# Patient Record
Sex: FEMALE | Race: WHITE | NOT HISPANIC OR LATINO | Employment: FULL TIME | URBAN - NONMETROPOLITAN AREA
[De-identification: names, ages, dates, MRNs, and addresses within clinical notes are randomized per-mention and may not be internally consistent; named-entity substitution may affect disease eponyms.]

---

## 2023-03-29 ENCOUNTER — OFFICE VISIT (OUTPATIENT)
Dept: FAMILY MEDICINE CLINIC | Facility: CLINIC | Age: 33
End: 2023-03-29
Payer: COMMERCIAL

## 2023-03-29 VITALS
TEMPERATURE: 98 F | RESPIRATION RATE: 15 BRPM | SYSTOLIC BLOOD PRESSURE: 118 MMHG | HEART RATE: 81 BPM | OXYGEN SATURATION: 99 % | BODY MASS INDEX: 24.11 KG/M2 | WEIGHT: 131 LBS | HEIGHT: 62 IN | DIASTOLIC BLOOD PRESSURE: 72 MMHG

## 2023-03-29 DIAGNOSIS — J10.1 INFLUENZA A: Primary | ICD-10-CM

## 2023-03-29 DIAGNOSIS — K21.9 GASTROESOPHAGEAL REFLUX DISEASE WITHOUT ESOPHAGITIS: ICD-10-CM

## 2023-03-29 LAB
EXPIRATION DATE: ABNORMAL
EXPIRATION DATE: NORMAL
FLUAV AG UPPER RESP QL IA.RAPID: DETECTED
FLUBV AG UPPER RESP QL IA.RAPID: NOT DETECTED
INTERNAL CONTROL: ABNORMAL
INTERNAL CONTROL: NORMAL
Lab: ABNORMAL
Lab: NORMAL
S PYO AG THROAT QL: NEGATIVE
SARS-COV-2 AG UPPER RESP QL IA.RAPID: NOT DETECTED

## 2023-03-29 RX ORDER — ALBUTEROL SULFATE 90 UG/1
2 AEROSOL, METERED RESPIRATORY (INHALATION) EVERY 4 HOURS PRN
Qty: 6.7 G | Refills: 0 | Status: SHIPPED | OUTPATIENT
Start: 2023-03-29

## 2023-03-29 RX ORDER — OMEPRAZOLE 20 MG/1
20 CAPSULE, DELAYED RELEASE ORAL DAILY
Qty: 30 CAPSULE | Refills: 3 | Status: SHIPPED | OUTPATIENT
Start: 2023-03-29

## 2023-03-29 RX ORDER — LORATADINE 10 MG/1
10 TABLET ORAL DAILY
Qty: 30 TABLET | Refills: 2 | Status: SHIPPED | OUTPATIENT
Start: 2023-03-29

## 2023-03-29 RX ORDER — FLUTICASONE PROPIONATE 50 MCG
2 SPRAY, SUSPENSION (ML) NASAL DAILY
Qty: 9.9 ML | Refills: 2 | Status: SHIPPED | OUTPATIENT
Start: 2023-03-29

## 2023-03-29 NOTE — PROGRESS NOTES
Office Note     Name: Gina Ferreira    : 1990     MRN: [id number]     Chief Complaint  Cough, congestion, body aches, headache, sore throat    History of Present Illness:  Gina Ferreira is a 32 y.o. female who presents today for symptoms of cough, congestion, body aches, headache, and sore throat.  She reports that the symptoms originally began 3 weeks ago.  But she reports that the symptoms largely resolved before returning this weekend.  The symptoms have been present for about 4 to 5 days this episode.  She does report having chills but no measured fever.  She reports the cough is dry in nature, no sputum production.  Denies hemoptysis.  She denies shortness of air or wheezing. She does report occasionally experiencing chest tightness with deep breathing.  She reports rhinorrhea, postnasal drainage.  She also reports mild headache during this time.  She also reports an episode of diarrhea this morning.  No blood in the diarrhea.  She reports being very tired and that her appetite is down.  She does report she is continuing to drink well.  At home she has taken Claritin.  She denies any significant past medical history and is not on any daily medicines at present time.  She did not get her flu shot this year.  She denies any chance that she could be pregnant, she has had a tubal ligation.    She does also reports she has been taking omeprazole over-the-counter for GERD.  She reports that this is controlling her symptoms well.  She denies dysphagia.  She does request that a prescription be sent to the pharmacy for this medication.    Subjective     Review of Systems:   Review of Systems   Constitutional: Positive for appetite change, fatigue and fever.   HENT: Positive for congestion, postnasal drip, rhinorrhea and sore throat. Negative for ear pain and trouble swallowing.    Respiratory: Positive for cough and chest tightness. Negative for shortness of breath and wheezing.    Cardiovascular:  Negative for chest pain.   Gastrointestinal: Positive for diarrhea. Negative for abdominal pain, blood in stool, constipation, nausea and vomiting.   Musculoskeletal: Positive for arthralgias and myalgias.   Neurological: Positive for headache. Negative for dizziness, weakness and light-headedness.       I have reviewed the patients family history, social history, past medical history, past surgical history and have updated it as appropriate.     Past Medical History:   Past Medical History:   Diagnosis Date   • Abdominal discomfort in right upper quadrant    • Allergic rhinitis, seasonal    • Anxiety    • Anxiety diarrhea    • Anxiety disorder, unspecified    • Bronchitis, acute    • Candida vaginitis    • Carpal tunnel syndrome, bilateral    • Constipation    • Contact dermatitis and eczema due to detergents    • Cough    • COVID    • Diarrhea    • Dysuria    • Gaseous abdominal distention    • Gastritis    • Gastroenteritis, acute    • GERD (gastroesophageal reflux disease)    • Hyperglycemia    • Hypertension, benign    • Irritable bowel syndrome with constipation    • Lack of immunity to hepatitis B virus demonstrated by serologic test    • Low back pain    • Lymphadenopathy    • Muscle contraction headache    • Nausea    • Rheumatic nodule (HCC)    • Tinea sussy    • Tobacco use disorder    • Tooth abscess    • Uncomplicated herpes simplex    • URI (upper respiratory infection)    • UTI (urinary tract infection)        Past Surgical History:   Past Surgical History:   Procedure Laterality Date   • TUBAL ABDOMINAL LIGATION         Family History:   Family History   Problem Relation Age of Onset   • Stroke Other    • Diabetes Other    • Hypertension Other        Social History:   Social History     Socioeconomic History   • Marital status:    Tobacco Use   • Smoking status: Former     Types: Cigarettes     Passive exposure: Past   • Smokeless tobacco: Current   Substance and Sexual Activity   • Alcohol  "use: Not Currently   • Drug use: Not Currently   • Sexual activity: Defer     Partners: Male       Immunizations:   There is no immunization history on file for this patient.     Medications:     Current Outpatient Medications:   •  albuterol sulfate  (90 Base) MCG/ACT inhaler, Inhale 2 puffs Every 4 (Four) Hours As Needed for Wheezing., Disp: 6.7 g, Rfl: 0  •  fluticasone (FLONASE) 50 MCG/ACT nasal spray, 2 sprays into the nostril(s) as directed by provider Daily., Disp: 9.9 mL, Rfl: 2  •  loratadine (Claritin) 10 MG tablet, Take 1 tablet by mouth Daily., Disp: 30 tablet, Rfl: 2  •  omeprazole (priLOSEC) 20 MG capsule, Take 1 capsule by mouth Daily., Disp: 30 capsule, Rfl: 3    Allergies:   No Known Allergies    Objective     Vital Signs  Vitals:    03/29/23 1104   BP: 118/72   BP Location: Left arm   Patient Position: Sitting   Cuff Size: Adult   Pulse: 81   Resp: 15   Temp: 98 °F (36.7 °C)   SpO2: 99%   Weight: 59.4 kg (131 lb)   Height: 157.5 cm (62\")     Estimated body mass index is 23.96 kg/m² as calculated from the following:    Height as of this encounter: 157.5 cm (62\").    Weight as of this encounter: 59.4 kg (131 lb).          Physical Exam  Vitals and nursing note reviewed.   Constitutional:       General: She is not in acute distress.     Appearance: Normal appearance. She is not ill-appearing, toxic-appearing or diaphoretic.   HENT:      Head: Normocephalic and atraumatic.      Right Ear: Ear canal and external ear normal. Tympanic membrane is not perforated, erythematous, retracted or bulging.      Left Ear: Ear canal and external ear normal. Tympanic membrane is not perforated, erythematous, retracted or bulging.      Nose: Congestion present. No rhinorrhea.      Mouth/Throat:      Mouth: Mucous membranes are moist.      Pharynx: Uvula midline. No pharyngeal swelling, oropharyngeal exudate or posterior oropharyngeal erythema.      Tonsils: No tonsillar exudate. 1+ on the right. 1+ on the left. "      Comments: Postnasal drainage visible in pharynx  Eyes:      General:         Right eye: No discharge.         Left eye: No discharge.      Extraocular Movements: Extraocular movements intact.   Cardiovascular:      Rate and Rhythm: Normal rate and regular rhythm.      Heart sounds: No murmur heard.    No friction rub. No gallop.   Pulmonary:      Effort: No respiratory distress.      Breath sounds: No wheezing, rhonchi or rales.   Abdominal:      General: Bowel sounds are normal.      Tenderness: There is no abdominal tenderness. There is no guarding or rebound.   Musculoskeletal:      Cervical back: Normal range of motion. No rigidity.   Lymphadenopathy:      Cervical: Cervical adenopathy (Anterior) present.   Skin:     General: Skin is warm.   Neurological:      Mental Status: She is alert.      Gait: Gait normal.   Psychiatric:         Mood and Affect: Mood normal.         Thought Content: Thought content normal.          Assessment and Plan     1. Influenza A  -The patient is positive for influenza A.  -We discussed the treatment is supportive, increasing hydration, Tylenol or Motrin for fever and body aches, heating pads for body aches, Flonase for congestion/rhinorrhea, Vicks VapoRub on the chest.  -We discussed the pros and cons of treating with Tamiflu. At this time, we will not use Tamiflu for treatment.  She has been symptomatic for greater than 48 hours.  -We discussed the possibility of post-viral bacterial infections, such as sinusitis and pneumonia. If symptoms worsen, fail to improve, or new symptoms develop, they should return to care  -Have ordered chest x-ray to rule out possible pneumonia, as she has been having symptoms for 3 weeks time.  -Also sent in a prescription of Claritin to help with nasal congestion, cough, etc.  -Prescription for albuterol inhaler has also been sent due to her symptoms of chest tightness.   -She verbalizes understanding and has no further questions.  - POCT  SARS-CoV-2 Antigen JANES + Flu  - POCT rapid strep A  - fluticasone (FLONASE) 50 MCG/ACT nasal spray; 2 sprays into the nostril(s) as directed by provider Daily.  Dispense: 9.9 mL; Refill: 2  - loratadine (Claritin) 10 MG tablet; Take 1 tablet by mouth Daily.  Dispense: 30 tablet; Refill: 2  - XR Chest PA & Lateral; Future  - albuterol sulfate  (90 Base) MCG/ACT inhaler; Inhale 2 puffs Every 4 (Four) Hours As Needed for Wheezing.  Dispense: 6.7 g; Refill: 0    2. Gastroesophageal reflux disease without esophagitis  -Way to control GERD: Lose weight, Raise the head of your bed, avoidance of foods that make symptoms worse, reduce or stop alcohol/ coffee/ soda/ tea/ juice intake and stopping smoking. Recommend Eating small frequent meals and avoidance of laying down after meals.  -Prescription for omeprazole has been sent to the pharmacy.  -Ideally, we would not need use of long-term PPI therapy, as there are risks/consequences of being on PPIs long-term.  -We discussed red flags of GERD.  If she has any of the symptoms, she needs to return to care.  -Verbalizes understanding and has no further questions.  - omeprazole (priLOSEC) 20 MG capsule; Take 1 capsule by mouth Daily.  Dispense: 30 capsule; Refill: 3       Follow Up  Return in about 1 month (around 4/29/2023) for Annual physical.    Rosa Gannon PA-C  [facility] [place] [facility] [place]

## 2023-08-09 ENCOUNTER — OFFICE VISIT (OUTPATIENT)
Dept: FAMILY MEDICINE CLINIC | Facility: CLINIC | Age: 33
End: 2023-08-09
Payer: COMMERCIAL

## 2023-08-09 VITALS
WEIGHT: 133 LBS | SYSTOLIC BLOOD PRESSURE: 110 MMHG | TEMPERATURE: 99.1 F | HEART RATE: 78 BPM | RESPIRATION RATE: 16 BRPM | OXYGEN SATURATION: 99 % | DIASTOLIC BLOOD PRESSURE: 80 MMHG | BODY MASS INDEX: 24.48 KG/M2 | HEIGHT: 62 IN

## 2023-08-09 DIAGNOSIS — U07.1 COVID-19: Primary | ICD-10-CM

## 2023-08-09 LAB
EXPIRATION DATE: ABNORMAL
EXPIRATION DATE: NORMAL
FLUAV AG UPPER RESP QL IA.RAPID: NOT DETECTED
FLUBV AG UPPER RESP QL IA.RAPID: NOT DETECTED
INTERNAL CONTROL: ABNORMAL
INTERNAL CONTROL: NORMAL
Lab: ABNORMAL
Lab: NORMAL
S PYO AG THROAT QL: NEGATIVE
SARS-COV-2 AG UPPER RESP QL IA.RAPID: DETECTED

## 2023-08-09 NOTE — PROGRESS NOTES
Office Note     Name: Gina Ferreira    : 1990     MRN: [id number]     Chief Complaint  Sore Throat, Cough, and Nasal Congestion    History of Present Illness:  Gina Ferreira is a 32 y.o. female who presents today for symptoms of sore throat, cough, nasal congestion.  She reports the symptoms started  (3 to 4 days ago). She also reports feeling significant fatigue and diffuse bodyaches.  She reports that she has had a sinus pressure headache, and feels a sensation of pressure along her forehead bilaterally.  She does report that the headache is worse when she leans forward.  She reports the headache is mild overall.  She has no changes in vision, no nausea or vomiting, no sensitivity to light or sound.  She reports that she is continuing to drink well but is eating a little less.  She does report altered sense of taste.  She reports her cough is dry in nature, no sputum production.  She denies any hemoptysis.  She does report that she feels a little labored in her breathing.  She denies wheezing.  She has not needed her albuterol inhaler.  She denies chest pain.  She denies any GI symptoms.  At home, she has taken Zyrtec.  She has no known sick contacts.  Of note, she did go on vacation 2 weeks ago.  She has had COVID-19 before.  She reports that she did well with COVID previously.  She did not receive any COVID vaccinations.      Subjective     Review of Systems:   Review of Systems   Constitutional:  Positive for appetite change, chills and fatigue. Negative for fever.   HENT:  Positive for congestion, sinus pressure and sore throat. Negative for postnasal drip, rhinorrhea and trouble swallowing.    Eyes:  Negative for blurred vision and double vision.   Respiratory:  Positive for cough and shortness of breath. Negative for chest tightness and wheezing.    Cardiovascular:  Negative for chest pain and leg swelling.   Gastrointestinal:  Negative for abdominal pain, constipation, diarrhea, nausea  and vomiting.   Musculoskeletal:  Positive for arthralgias and myalgias.   Neurological:  Positive for headache. Negative for dizziness, syncope, weakness and light-headedness.     I have reviewed the patients family history, social history, past medical history, past surgical history and have updated it as appropriate.     Past Medical History:   Past Medical History:   Diagnosis Date    Abdominal discomfort in right upper quadrant     Allergic rhinitis, seasonal     Anxiety     Anxiety diarrhea     Anxiety disorder, unspecified     Bronchitis, acute     Candida vaginitis     Carpal tunnel syndrome, bilateral     Constipation     Contact dermatitis and eczema due to detergents     Cough     COVID     Diarrhea     Dysuria     Gaseous abdominal distention     Gastritis     Gastroenteritis, acute     GERD (gastroesophageal reflux disease)     Hyperglycemia     Hypertension, benign     Irritable bowel syndrome with constipation     Lack of immunity to hepatitis B virus demonstrated by serologic test     Low back pain     Lymphadenopathy     Muscle contraction headache     Nausea     Rheumatic nodule     Tinea sussy     Tobacco use disorder     Tooth abscess     Uncomplicated herpes simplex     URI (upper respiratory infection)     UTI (urinary tract infection)        Past Surgical History:   Past Surgical History:   Procedure Laterality Date    TUBAL ABDOMINAL LIGATION         Family History:   Family History   Problem Relation Age of Onset    Stroke Other     Diabetes Other     Hypertension Other        Social History:   Social History     Socioeconomic History    Marital status:    Tobacco Use    Smoking status: Former     Types: Cigarettes     Passive exposure: Past    Smokeless tobacco: Current   Substance and Sexual Activity    Alcohol use: Not Currently    Drug use: Not Currently    Sexual activity: Defer     Partners: Male       Immunizations:   There is no immunization history on file for this patient.  "    Medications:     Current Outpatient Medications:     albuterol sulfate  (90 Base) MCG/ACT inhaler, Inhale 2 puffs Every 4 (Four) Hours As Needed for Wheezing., Disp: 6.7 g, Rfl: 0    fluticasone (FLONASE) 50 MCG/ACT nasal spray, 2 sprays into the nostril(s) as directed by provider Daily., Disp: 9.9 mL, Rfl: 2    loratadine (Claritin) 10 MG tablet, Take 1 tablet by mouth Daily., Disp: 30 tablet, Rfl: 2    omeprazole (priLOSEC) 40 MG capsule, Take 1 capsule by mouth Daily., Disp: 30 capsule, Rfl: 5    ondansetron ODT (ZOFRAN-ODT) 4 MG disintegrating tablet, Place 1 tablet on the tongue Every 8 (Eight) Hours As Needed for Nausea or Vomiting., Disp: 15 tablet, Rfl: 0    polyethylene glycol (MiraLax) 17 GM/SCOOP powder, Take 17 g by mouth Daily., Disp: 116 g, Rfl: 1    Allergies:   No Known Allergies    Objective     Vital Signs  Vitals:    08/09/23 1301   BP: 110/80   BP Location: Right arm   Patient Position: Sitting   Cuff Size: Adult   Pulse: 78   Resp: 16   Temp: 99.1 øF (37.3 øC)   TempSrc: Temporal   SpO2: 99%   Weight: 60.3 kg (133 lb)   Height: 157.5 cm (62.01\")     Estimated body mass index is 24.32 kg/mý as calculated from the following:    Height as of this encounter: 157.5 cm (62.01\").    Weight as of this encounter: 60.3 kg (133 lb).    BMI is within normal parameters. No other follow-up for BMI required.      Physical Exam  Vitals and nursing note reviewed.   Constitutional:       General: She is not in acute distress.     Appearance: Normal appearance. She is not ill-appearing, toxic-appearing or diaphoretic.   HENT:      Head: Normocephalic and atraumatic.      Comments: Reports mild tenderness to palpation of sinuses     Right Ear: Ear canal and external ear normal. Tympanic membrane is not perforated, erythematous, retracted or bulging.      Left Ear: Ear canal and external ear normal. Tympanic membrane is not perforated, erythematous, retracted or bulging.      Nose: Congestion present. No " rhinorrhea.      Mouth/Throat:      Mouth: Mucous membranes are moist.      Pharynx: Uvula midline. Posterior oropharyngeal erythema present. No pharyngeal swelling or oropharyngeal exudate.      Tonsils: No tonsillar exudate. 2+ on the right. 2+ on the left.   Eyes:      General:         Right eye: No discharge.         Left eye: No discharge.      Extraocular Movements: Extraocular movements intact.   Cardiovascular:      Rate and Rhythm: Normal rate and regular rhythm.      Heart sounds: No murmur heard.    No friction rub. No gallop.   Pulmonary:      Effort: No respiratory distress.      Breath sounds: No wheezing, rhonchi or rales.   Musculoskeletal:      Cervical back: Normal range of motion. No rigidity.   Lymphadenopathy:      Cervical: No cervical adenopathy.   Skin:     General: Skin is warm.   Neurological:      Mental Status: She is alert.      Gait: Gait normal.   Psychiatric:         Mood and Affect: Mood normal.         Thought Content: Thought content normal.        Assessment and Plan     1. COVID-19  -The patient is positive for COVID-19  -We discussed the treatment is supportive, increasing hydration, Tylenol or Motrin for fever and body aches, heating pads for body aches, Flonase for congestion/rhinorrhea, Vicks VapoRub on the chest.  -We did discuss the importance of staying active, moving around and not being sedentary to prevent blood clots.  -We also discussed some studies show benefit with over-the-counter supplements such as zinc, vitamin C, probiotics, and melatonin at night.  We did discuss that melatonin can make her drowsy, she should not take prior to driving or operate machinery.  -She should also buy a pulse oximeter to keep an eye on her oxygen saturations.  -We discussed the possibility of post-viral bacterial infections, such as sinusitis and pneumonia. If symptoms worsen, fail to improve, or new symptoms develop, they should return to care   - POCT SARS-CoV-2 Antigen JANES +  Flu  - POCT rapid strep A       Follow Up  Return if symptoms worsen or fail to improve.    Rosa Gannon PA-C  [facility]

## 2023-10-23 ENCOUNTER — TELEPHONE (OUTPATIENT)
Dept: FAMILY MEDICINE CLINIC | Facility: CLINIC | Age: 33
End: 2023-10-23
Payer: COMMERCIAL

## 2023-10-23 NOTE — TELEPHONE ENCOUNTER
Caller: Gina Ferreira    Relationship: Self    Best call back number: [phone]     What medication are you requesting: MEDICATION FOR YEAST INFECTION     What are your current symptoms: ODORLESS DISCHARGE, ITCHING AND BURNING DURING URINATION     How long have you been experiencing symptoms: 10/21    If a prescription is needed, what is your preferred pharmacy and phone number: [place] [address] [address] [facility] - [phone] [facility] [phone] FX     Additional notes:  PLEASE ADVISE PATIENT IF SOMETHING CAN BE CALLED IN WITHOUT BEING SEEN.

## 2023-10-23 NOTE — TELEPHONE ENCOUNTER
Hub staff attempted to follow warm transfer process and was unsuccessful     Caller: Gina Ferreira    Relationship to patient: Self    Best call back number:[phone]    Patient is needing: PATIENT RETURNED A CALL FROM GINA

## 2023-10-23 NOTE — TELEPHONE ENCOUNTER
ADVISED PATIENT ,SHE IS UNABLE TO COME IN DUE TO HER WORK SCHEDULE, IS GOING TO TRY SOMETHING OTC, WILL CALL BACK IF S/S DO NOT RESOLVE

## 2024-03-05 ENCOUNTER — OFFICE VISIT (OUTPATIENT)
Dept: FAMILY MEDICINE CLINIC | Facility: CLINIC | Age: 34
End: 2024-03-05
Payer: COMMERCIAL

## 2024-03-05 ENCOUNTER — LAB (OUTPATIENT)
Dept: FAMILY MEDICINE CLINIC | Facility: CLINIC | Age: 34
End: 2024-03-05
Payer: COMMERCIAL

## 2024-03-05 VITALS
HEIGHT: 62 IN | SYSTOLIC BLOOD PRESSURE: 112 MMHG | BODY MASS INDEX: 25.76 KG/M2 | DIASTOLIC BLOOD PRESSURE: 76 MMHG | WEIGHT: 140 LBS | RESPIRATION RATE: 18 BRPM | TEMPERATURE: 98 F | OXYGEN SATURATION: 99 % | HEART RATE: 78 BPM

## 2024-03-05 DIAGNOSIS — K21.9 GASTROESOPHAGEAL REFLUX DISEASE WITHOUT ESOPHAGITIS: ICD-10-CM

## 2024-03-05 DIAGNOSIS — G43.709 CHRONIC MIGRAINE WITHOUT AURA WITHOUT STATUS MIGRAINOSUS, NOT INTRACTABLE: ICD-10-CM

## 2024-03-05 DIAGNOSIS — R76.8 POSITIVE HEPATITIS C ANTIBODY TEST: ICD-10-CM

## 2024-03-05 DIAGNOSIS — Z00.00 WELL ADULT EXAM: Primary | ICD-10-CM

## 2024-03-05 DIAGNOSIS — Z00.00 WELL ADULT EXAM: ICD-10-CM

## 2024-03-05 DIAGNOSIS — Z23 NEED FOR TDAP VACCINATION: ICD-10-CM

## 2024-03-05 PROCEDURE — 80061 LIPID PANEL: CPT | Performed by: FAMILY MEDICINE

## 2024-03-05 PROCEDURE — 86803 HEPATITIS C AB TEST: CPT | Performed by: FAMILY MEDICINE

## 2024-03-05 PROCEDURE — 86140 C-REACTIVE PROTEIN: CPT | Performed by: FAMILY MEDICINE

## 2024-03-05 PROCEDURE — 81003 URINALYSIS AUTO W/O SCOPE: CPT | Performed by: FAMILY MEDICINE

## 2024-03-05 PROCEDURE — 85652 RBC SED RATE AUTOMATED: CPT | Performed by: FAMILY MEDICINE

## 2024-03-05 PROCEDURE — 83036 HEMOGLOBIN GLYCOSYLATED A1C: CPT | Performed by: FAMILY MEDICINE

## 2024-03-05 PROCEDURE — 80307 DRUG TEST PRSMV CHEM ANLYZR: CPT | Performed by: FAMILY MEDICINE

## 2024-03-05 PROCEDURE — 82607 VITAMIN B-12: CPT | Performed by: FAMILY MEDICINE

## 2024-03-05 PROCEDURE — 80050 GENERAL HEALTH PANEL: CPT | Performed by: FAMILY MEDICINE

## 2024-03-05 RX ORDER — CLOSTRIDIUM TETANI TOXOID ANTIGEN (FORMALDEHYDE INACTIVATED), CORYNEBACTERIUM DIPHTHERIAE TOXOID ANTIGEN (FORMALDEHYDE INACTIVATED), BORDETELLA PERTUSSIS TOXOID ANTIGEN (GLUTARALDEHYDE INACTIVATED), BORDETELLA PERTUSSIS FILAMENTOUS HEMAGGLUTININ ANTIGEN (FORMALDEHYDE INACTIVATED), BORDETELLA PERTUSSIS PERTACTIN ANTIGEN, AND BORDETELLA PERTUSSIS FIMBRIAE 2/3 ANTIGEN 5; 2; 2.5; 5; 3; 5 [LF]/.5ML; [LF]/.5ML; UG/.5ML; UG/.5ML; UG/.5ML; UG/.5ML
0.5 INJECTION, SUSPENSION INTRAMUSCULAR ONCE
Qty: 0.5 ML | Refills: 0 | Status: SHIPPED | OUTPATIENT
Start: 2024-03-05 | End: 2024-03-05

## 2024-03-05 RX ORDER — SUMATRIPTAN 100 MG/1
TABLET, FILM COATED ORAL
Qty: 9 TABLET | Refills: 11 | Status: SHIPPED | OUTPATIENT
Start: 2024-03-05

## 2024-03-05 RX ORDER — TOPIRAMATE 50 MG/1
50 TABLET, FILM COATED ORAL 2 TIMES DAILY
Qty: 60 TABLET | Refills: 0 | Status: SHIPPED | OUTPATIENT
Start: 2024-03-05

## 2024-03-05 NOTE — PROGRESS NOTES
"     Female Physical Note      Date: 2024   Patient Name: Gina Ferreira  : 1990   MRN: [id number]     Chief Complaint:    Chief Complaint   Patient presents with    Migraine       History of Present Illness: Gina Ferreira is a 33 y.o. female who is here today for their annual health maintenance and physical.  Patient has been doing relatively well since last being seen but has had increase in migraine headaches.  Patient states that she is having weekly headaches of 3-4 times a day.  The headaches are similar to her previous migraines.  They are not associated with any neurologic complaints except for some nausea as well as photophobia.  She has no aura at present time.  The headaches do not wake her up at night.  She has not found a trigger at present time except when she does wear her helmet at work and is in a 30 °F environment.  Patient has not had any sinus symptomatology.  Her vision has been appropriate otherwise.  She has continue with her usual activity, appetite and sleep.  Patient denies any other cardiovascular, respiratory, gastrointestinal, urologic and neurologic complaints.  She does have occasional GERD symptoms that appear to be independent from her headaches.  She has been having problems with sleep.  This does appear to be most likely due to her 's sleep habits.  She does have problems trying to \"turn off her brain\" at night on occasion.  Patient does not have history of kidney stones.      Subjective      Review of Systems:   Review of Systems   Constitutional:  Negative for activity change, appetite change and fatigue.   Respiratory:  Negative for cough, shortness of breath and wheezing.    Cardiovascular:  Negative for chest pain, palpitations and leg swelling.   Gastrointestinal:  Positive for nausea and GERD. Negative for abdominal distention, abdominal pain, blood in stool, constipation, diarrhea, vomiting and indigestion.   Genitourinary:  Negative for dysuria, " flank pain, frequency and urgency.   Musculoskeletal:  Negative for arthralgias and myalgias.   Neurological:  Positive for headache. Negative for dizziness, tremors, seizures, syncope, weakness, light-headedness, numbness and memory problem.   Psychiatric/Behavioral:  Positive for sleep disturbance. Negative for depressed mood. The patient is not nervous/anxious.        Past Medical History, Social History, Family History and Care Team were all reviewed with patient and updated as appropriate.     Medications:     Current Outpatient Medications:     omeprazole (priLOSEC) 40 MG capsule, Take 1 capsule by mouth Daily., Disp: 30 capsule, Rfl: 5    polyethylene glycol (MiraLax) 17 GM/SCOOP powder, Take 17 g by mouth Daily., Disp: 116 g, Rfl: 1    SUMAtriptan (Imitrex) 100 MG tablet, Take one tablet at onset of headache. May repeat dose one time in 2 hours if headache not relieved., Disp: 9 tablet, Rfl: 11    topiramate (Topamax) 50 MG tablet, Take 1 tablet by mouth 2 (Two) Times a Day., Disp: 60 tablet, Rfl: 0    Allergies:   No Known Allergies    Immunizations:  Health Maintenance Summary            Overdue - TDAP/TD VACCINES (1 - Tdap) Never done      No completion, postpone, or frequency change history exists for this topic.              Ordered - HEPATITIS C SCREENING (Once) Ordered on 3/5/2024      No completion, postpone, or frequency change history exists for this topic.              Overdue - PAP SMEAR (Every 3 Years) Never done      No completion, postpone, or frequency change history exists for this topic.              Postponed - INFLUENZA VACCINE (Yearly - August to March) Postponed until 3/31/2024      03/05/2024  Postponed until 3/31/2024 by Mark Ma MD (Patient Refused)              Postponed - COVID-19 Vaccine (1 - 2023-24 season) Postponed until 3/4/2025      03/05/2024  Postponed until 3/4/2025 by Mark Ma MD (Patient Refused)              ANNUAL PHYSICAL (Yearly)  "Next due on 3/5/2025      03/05/2024  Done              BMI FOLLOWUP (Yearly) Next due on 3/5/2025      03/05/2024  [employer]: WORKFLOW - QUALITY MEASUREMENT - DOCUMENTED WEIGHT FOLLOW-UP PLAN              Pneumococcal Vaccine 0-64 (Series Information) Aged Out      No completion, postpone, or frequency change history exists for this topic.                     No orders of the defined types were placed in this encounter.       Colorectal Screening:   Deferred.  Last Completed Colonoscopy       This patient has no relevant Health Maintenance data.          Pap: Advised.  Last Completed Pap Smear       This patient has no relevant Health Maintenance data.           Mammogram: Deferred.  Last Completed Mammogram       This patient has no relevant Health Maintenance data.             CT for Smoker (Age 50-80, 20 pk yr):   Deferred.  Bone Density/DEXA (Age 65 or high risk): Deferred.  Hep C (Age 18-79 once): Ordered.  HIV (Age 15-65 once): No results found for: \"HIV1X2\"  A1c: No results found for: \"HGBA1C\"   Lipid panel:  No results found for: \"LIPIDEXCLUSI\"    The ASCVD Risk score (Johnnie DK, et al., 2019) failed to calculate for the following reasons:    The 2019 ASCVD risk score is only valid for ages 40 to 79    Dermatology: Deferred.  Ophthalmologist: Advised.  Dentist: Advised.    Tobacco Use: High Risk (3/5/2024)    Patient History     Smoking Tobacco Use: Former     Smokeless Tobacco Use: Current     Passive Exposure: Past       Social History     Substance and Sexual Activity   Alcohol Use Not Currently        Social History     Substance and Sexual Activity   Drug Use Not Currently        Diet/Physical activity: Well-balanced diet/daily exercise.    Sexual Health: No issues at present time.   Menopause: No issues at present time.  Menstrual Cycles: No issues at present time.    Depression: PHQ-2 Depression Screening  PHQ-9 Total Score: 0     Measures:   Advanced Care Planning:   Patient does not have an advance " "directive, information provided.    Smoking Cessation:   Non-smoker.  Patient does use electronic cigarettes.    Objective     Physical Exam:  Vital Signs:   Vitals:    03/05/24 1637   BP: 112/76   BP Location: Left arm   Patient Position: Sitting   Cuff Size: Adult   Pulse: 78   Resp: 18   Temp: 98 °F (36.7 °C)   TempSrc: Temporal   SpO2: 99%   Weight: 63.5 kg (140 lb)   Height: 157.5 cm (62.01\")     Body mass index is 25.6 kg/m².   Facility age limit for growth %kaye is 20 years.    Physical Exam  Vitals and nursing note reviewed.   Constitutional:       Appearance: Normal appearance.   HENT:      Head: Normocephalic and atraumatic.      Nose: Nose normal.      Mouth/Throat:      Pharynx: Oropharynx is clear.   Eyes:      Extraocular Movements: Extraocular movements intact.      Pupils: Pupils are equal, round, and reactive to light. Pupils are equal.      Right eye: Pupil is not sluggish.      Left eye: Pupil is not sluggish.      Funduscopic exam:     Right eye: No hemorrhage, exudate, AV nicking or papilledema.         Left eye: No hemorrhage, exudate, AV nicking or papilledema.   Neck:      Thyroid: No thyroid mass or thyromegaly.      Trachea: Trachea normal.   Cardiovascular:      Rate and Rhythm: Normal rate and regular rhythm.      Pulses: Normal pulses. No decreased pulses.      Heart sounds: Normal heart sounds.   Pulmonary:      Effort: Pulmonary effort is normal.      Breath sounds: Normal breath sounds.   Abdominal:      General: Abdomen is flat. Bowel sounds are normal.      Palpations: Abdomen is soft.      Tenderness: There is no abdominal tenderness.   Musculoskeletal:      Cervical back: Neck supple.      Right lower leg: No edema.      Left lower leg: No edema.   Lymphadenopathy:      Cervical: No cervical adenopathy.   Skin:     General: Skin is warm and dry.   Neurological:      General: No focal deficit present.      Mental Status: She is alert and oriented to person, place, and time.      " "Sensory: Sensation is intact.      Motor: Motor function is intact.      Coordination: Coordination is intact.   Psychiatric:         Attention and Perception: Attention normal.         Mood and Affect: Mood normal.         Speech: Speech normal.         Behavior: Behavior normal.         POCT Results (if applicable);       Procedures    Assessment / Plan      Assessment/Plan:   Diagnoses and all orders for this visit:    1. Well adult exam (Primary)  Patient did have a wellness exam performed today that did not reveal any abnormality.  Patient did well with respect to her anxiety/depression symptomatology.  We did discuss anticipatory guidance as well as safety concerns.  We will continue to monitor symptoms and if she has other problems or complaints further assessment treatment will be necessary.  -     CBC (No Diff); Future  -     Comprehensive Metabolic Panel; Future  -     Hemoglobin A1c; Future  -     Hepatitis C Antibody; Future  -     Lipid Panel; Future  -     TSH Rfx On Abnormal To Free T4; Future  -     Vitamin B12; Future  -     Urinalysis without microscopic (no culture) - Urine, Clean Catch; Future  -     Urine Drug Screen - Urine, Clean Catch; Future  -     Sedimentation Rate; Future  -     C-reactive Protein; Future    2. Chronic migraine without aura without status migrainosus, not intractable  Patient does appear to have some headaches that do not appear to be related to migraines.  I am uncertain if this is due to muscle contraction headache as she states that this can be associated with use of wearing her hard hat.  We have discussed a headache diary to see if there is any other triggers such as food, activities excetra.  She does have what she describes as her \"classic migraines\".  These do appear to be several times per month.  We have discussed using Imitrex at the initial dose of 50 mg with increase to 100 mg as necessary.  We have also encouraged her to consider use of Topamax for a " preventative.  She will start at 25 mg at night of the Topamax and slowly increase to the dose that is therapeutic up to 50 mg twice daily.  We will plan on reassessing later on this month to see how she is doing.  This is a chronic condition that is worsening.  We will have to obtain close follow-up with possible referral to her neurologist.  -     SUMAtriptan (Imitrex) 100 MG tablet; Take one tablet at onset of headache. May repeat dose one time in 2 hours if headache not relieved.  Dispense: 9 tablet; Refill: 11  -     topiramate (Topamax) 50 MG tablet; Take 1 tablet by mouth 2 (Two) Times a Day.  Dispense: 60 tablet; Refill: 0    3. Gastroesophageal reflux disease without esophagitis   Patient is having some reflux symptomatology.  We will try some omeprazole.  If she has worsening symptoms or persistent symptoms after she does the trial for 2 weeks further evaluation treatment may be necessary.    4. Need for Tdap vaccination  Patient is of need to have a tetanus booster.  We will send a request to the pharmacy.  -     Tdap (Adacel) 5-2-15.5 LF-MCG/0.5 injection; Inject 0.5 mL into the appropriate muscle as directed by prescriber 1 (One) Time for 1 dose.  Dispense: 0.5 mL; Refill: 0         Healthcare Maintenance:  Counseling provided based on age appropriate USPSTF guidelines.  BMI is >= 25 and <30. (Overweight) The following options were offered after discussion;: exercise counseling/recommendations and nutrition counseling/recommendations    Gina Ferreira voices understanding and acceptance of this advice and will call back with any further questions or concerns. AVS with preventive healthcare tips printed for patient.     Follow Up:   Return in about 3 weeks (around 3/26/2024).        At [facility], we believe that sharing information builds trust and better relationships. You are receiving this note because you recently visited [facility]. It is possible you will see health information before a  provider has talked with you about it. This kind of information can be easy to misunderstand. To help you fully understand what it means for your health, we urge you to discuss this note with your provider.    Mark Ma MD  [facility]

## 2024-03-06 LAB
AMPHET+METHAMPHET UR QL: NEGATIVE
AMPHETAMINES UR QL: NEGATIVE
BARBITURATES UR QL SCN: NEGATIVE
BENZODIAZ UR QL SCN: NEGATIVE
BILIRUB UR QL STRIP: NEGATIVE
BUPRENORPHINE SERPL-MCNC: NEGATIVE NG/ML
CANNABINOIDS SERPL QL: NEGATIVE
CLARITY UR: CLEAR
COCAINE UR QL: NEGATIVE
COLOR UR: YELLOW
DEPRECATED RDW RBC AUTO: 40.9 FL (ref 37–54)
ERYTHROCYTE [DISTWIDTH] IN BLOOD BY AUTOMATED COUNT: 13.3 % (ref 12.3–15.4)
ERYTHROCYTE [SEDIMENTATION RATE] IN BLOOD: 1 MM/HR (ref 0–20)
FENTANYL UR-MCNC: NEGATIVE NG/ML
GLUCOSE UR STRIP-MCNC: NEGATIVE MG/DL
HCT VFR BLD AUTO: 36.4 % (ref 34–46.6)
HCV AB SER DONR QL: REACTIVE
HGB BLD-MCNC: 11.1 G/DL (ref 12–15.9)
HGB UR QL STRIP.AUTO: NEGATIVE
KETONES UR QL STRIP: NEGATIVE
LEUKOCYTE ESTERASE UR QL STRIP.AUTO: NEGATIVE
MCH RBC QN AUTO: 25.8 PG (ref 26.6–33)
MCHC RBC AUTO-ENTMCNC: 30.5 G/DL (ref 31.5–35.7)
MCV RBC AUTO: 84.5 FL (ref 79–97)
METHADONE UR QL SCN: NEGATIVE
NITRITE UR QL STRIP: NEGATIVE
OPIATES UR QL: NEGATIVE
OXYCODONE UR QL SCN: NEGATIVE
PCP UR QL SCN: NEGATIVE
PH UR STRIP.AUTO: 6.5 [PH] (ref 5–8)
PLATELET # BLD AUTO: 383 10*3/MM3 (ref 140–450)
PMV BLD AUTO: 9.9 FL (ref 6–12)
PROT UR QL STRIP: NEGATIVE
RBC # BLD AUTO: 4.31 10*6/MM3 (ref 3.77–5.28)
SP GR UR STRIP: 1.03 (ref 1–1.03)
TRICYCLICS UR QL SCN: NEGATIVE
UROBILINOGEN UR QL STRIP: NORMAL
VIT B12 BLD-MCNC: 555 PG/ML (ref 211–946)
WBC NRBC COR # BLD AUTO: 4.6 10*3/MM3 (ref 3.4–10.8)

## 2024-03-07 LAB
ALBUMIN SERPL-MCNC: 4.5 G/DL (ref 3.5–5.2)
ALBUMIN/GLOB SERPL: 2 G/DL
ALP SERPL-CCNC: 49 U/L (ref 39–117)
ALT SERPL W P-5'-P-CCNC: 10 U/L (ref 1–33)
ANION GAP SERPL CALCULATED.3IONS-SCNC: 6 MMOL/L (ref 5–15)
AST SERPL-CCNC: 15 U/L (ref 1–32)
BILIRUB SERPL-MCNC: 0.2 MG/DL (ref 0–1.2)
BUN SERPL-MCNC: 16 MG/DL (ref 6–20)
BUN/CREAT SERPL: 22.9 (ref 7–25)
CALCIUM SPEC-SCNC: 9.2 MG/DL (ref 8.6–10.5)
CHLORIDE SERPL-SCNC: 107 MMOL/L (ref 98–107)
CHOLEST SERPL-MCNC: 173 MG/DL (ref 0–200)
CO2 SERPL-SCNC: 26 MMOL/L (ref 22–29)
CREAT SERPL-MCNC: 0.7 MG/DL (ref 0.57–1)
CRP SERPL-MCNC: <0.3 MG/DL (ref 0–0.5)
EGFRCR SERPLBLD CKD-EPI 2021: 115.8 ML/MIN/1.73
GLOBULIN UR ELPH-MCNC: 2.2 GM/DL
GLUCOSE SERPL-MCNC: 85 MG/DL (ref 65–99)
HBA1C MFR BLD: 5.1 % (ref 4.8–5.6)
HDLC SERPL-MCNC: 50 MG/DL (ref 40–60)
LDLC SERPL CALC-MCNC: 99 MG/DL (ref 0–100)
LDLC/HDLC SERPL: 1.92 {RATIO}
POTASSIUM SERPL-SCNC: 4.5 MMOL/L (ref 3.5–5.2)
PROT SERPL-MCNC: 6.7 G/DL (ref 6–8.5)
SODIUM SERPL-SCNC: 139 MMOL/L (ref 136–145)
TRIGL SERPL-MCNC: 134 MG/DL (ref 0–150)
TSH SERPL DL<=0.05 MIU/L-ACNC: 2.85 UIU/ML (ref 0.27–4.2)
VLDLC SERPL-MCNC: 24 MG/DL (ref 5–40)

## 2024-03-08 NOTE — PROGRESS NOTES
Called to speak with patient about blood work. Patient verbalized good understanding and appreciation. No questions or concerns.

## 2024-05-29 ENCOUNTER — TELEPHONE (OUTPATIENT)
Dept: FAMILY MEDICINE CLINIC | Facility: CLINIC | Age: 34
End: 2024-05-29
Payer: COMMERCIAL

## 2024-05-29 ENCOUNTER — OFFICE VISIT (OUTPATIENT)
Dept: FAMILY MEDICINE CLINIC | Facility: CLINIC | Age: 34
End: 2024-05-29
Payer: COMMERCIAL

## 2024-05-29 VITALS
HEIGHT: 62 IN | WEIGHT: 138.2 LBS | DIASTOLIC BLOOD PRESSURE: 90 MMHG | OXYGEN SATURATION: 99 % | SYSTOLIC BLOOD PRESSURE: 114 MMHG | HEART RATE: 80 BPM | BODY MASS INDEX: 25.43 KG/M2 | TEMPERATURE: 98.7 F | RESPIRATION RATE: 18 BRPM

## 2024-05-29 DIAGNOSIS — J01.00 ACUTE NON-RECURRENT MAXILLARY SINUSITIS: ICD-10-CM

## 2024-05-29 DIAGNOSIS — R68.89 FLU-LIKE SYMPTOMS: Primary | ICD-10-CM

## 2024-05-29 LAB
EXPIRATION DATE: NORMAL
EXPIRATION DATE: NORMAL
FLUAV AG UPPER RESP QL IA.RAPID: NOT DETECTED
FLUBV AG UPPER RESP QL IA.RAPID: NOT DETECTED
INTERNAL CONTROL: NORMAL
INTERNAL CONTROL: NORMAL
Lab: NORMAL
Lab: NORMAL
S PYO AG THROAT QL: NEGATIVE
SARS-COV-2 AG UPPER RESP QL IA.RAPID: NOT DETECTED

## 2024-05-29 RX ORDER — FLUCONAZOLE 150 MG/1
150 TABLET ORAL ONCE
Qty: 1 TABLET | Refills: 0 | Status: SHIPPED | OUTPATIENT
Start: 2024-05-29 | End: 2024-05-29

## 2024-05-29 RX ORDER — DOXYCYCLINE HYCLATE 100 MG/1
100 CAPSULE ORAL 2 TIMES DAILY
Qty: 20 CAPSULE | Refills: 0 | Status: SHIPPED | OUTPATIENT
Start: 2024-05-29

## 2024-05-29 RX ORDER — PREDNISONE 10 MG/1
60 TABLET ORAL DAILY
Qty: 18 TABLET | Refills: 0 | Status: SHIPPED | OUTPATIENT
Start: 2024-05-29 | End: 2024-06-01

## 2024-05-29 RX ORDER — PHENYLEPHRINE HCL/PROMETH HCL 5-6.25MG/5
5 SYRUP ORAL EVERY 4 HOURS PRN
Qty: 100 ML | Refills: 1 | Status: SHIPPED | OUTPATIENT
Start: 2024-05-29

## 2024-05-30 NOTE — PROGRESS NOTES
Answers submitted by the patient for this visit:  Primary Reason for Visit (Submitted on 2024)  What is the primary reason for your visit?: Cough  Cough Questionnaire (Submitted on 2024)  Chief Complaint: Cough  Chronicity: new  Onset: yesterday  Progression since onset: worsening  Frequency: every few minutes  Cough characteristics: dry  ear congestion: No  headaches: Yes  heartburn: No  hemoptysis: No  nasal congestion: Yes  sweats: No  Aggravated by: nothing  Risk factors for lung disease: smoking/tobacco exposure      Follow Up Office Visit      Date: 2024   Patient Name: Gina Ferreira  : 1990   MRN: [id number]     Chief Complaint:    Chief Complaint   Patient presents with    Cough    Nasal Congestion    Sore Throat     X 3 days.    Headache    Diarrhea     Some this morning.       History of Present Illness: Gina Ferreira is a 33 y.o. female who is here today for 3 day history of sore throat of sudden onset soon followed by nasal congestion and cough.  She has had headache but has not had myalgia,fever or chills.  No one else at home has been ill and she frequently suffers from allerfies this time of year.  She had some left over proethizine cough syrup and used some last night.    Subjective      Review of Systems:   Review of Systems   Constitutional:  Negative for chills, fever and unexpected weight loss.   HENT:  Positive for rhinorrhea and sore throat. Negative for ear pain and postnasal drip.    Respiratory:  Positive for cough. Negative for shortness of breath and wheezing.    Cardiovascular:  Negative for chest pain.   Musculoskeletal:  Negative for myalgias.   Skin:  Negative for rash.     I have reviewed the patients family history, social history, past medical history, past surgical history and have updated it as appropriate.     Medications:     Current Outpatient Medications:     omeprazole (priLOSEC) 40 MG capsule, Take 1 capsule by mouth Daily., Disp: 30 capsule, Rfl:  "5    polyethylene glycol (MiraLax) 17 GM/SCOOP powder, Take 17 g by mouth Daily., Disp: 116 g, Rfl: 1    SUMAtriptan (Imitrex) 100 MG tablet, Take one tablet at onset of headache. May repeat dose one time in 2 hours if headache not relieved., Disp: 9 tablet, Rfl: 11    doxycycline (VIBRAMYCIN) 100 MG capsule, Take 1 capsule by mouth 2 (Two) Times a Day., Disp: 20 capsule, Rfl: 0    predniSONE (DELTASONE) 10 MG tablet, Take 6 tablets by mouth Daily for 3 days., Disp: 18 tablet, Rfl: 0    promethazine-phenylephrine 6.25-5 MG/5ML syrup syrup, Take 5 mL by mouth Every 4 (Four) Hours As Needed (cough)., Disp: 100 mL, Rfl: 1    topiramate (Topamax) 50 MG tablet, Take 1 tablet by mouth 2 (Two) Times a Day. (Patient not taking: Reported on 5/29/2024), Disp: 60 tablet, Rfl: 0    Allergies:   No Known Allergies    Objective     Physical Exam: Please see above  Vital Signs:   Vitals:    05/29/24 1547   BP: 114/90   BP Location: Left arm   Patient Position: Sitting   Cuff Size: Adult   Pulse: 80   Resp: 18   Temp: 98.7 °F (37.1 °C)   TempSrc: Temporal   SpO2: 99%   Weight: 62.7 kg (138 lb 3.2 oz)   Height: 157.5 cm (62\")     Body mass index is 25.28 kg/m².    Physical Exam  Vitals and nursing note reviewed.   Constitutional:       General: She is not in acute distress.     Appearance: Normal appearance. She is not ill-appearing or toxic-appearing.   HENT:      Right Ear: Tympanic membrane normal.      Left Ear: Tympanic membrane normal.      Mouth/Throat:      Pharynx: No oropharyngeal exudate or posterior oropharyngeal erythema.   Eyes:      Extraocular Movements: Extraocular movements intact.      Pupils: Pupils are equal, round, and reactive to light.   Cardiovascular:      Rate and Rhythm: Normal rate and regular rhythm.      Heart sounds: No murmur heard.     No friction rub. No gallop.   Pulmonary:      Effort: Pulmonary effort is normal. No respiratory distress.      Breath sounds: Normal breath sounds. No wheezing or " rales.   Neurological:      Mental Status: She is alert.     Procedures    Assessment / Plan      Assessment/Plan:   1. Flu-like symptoms  Swabs are negative  - POCT rapid strep A  - POCT SARS-CoV-2 Antigen JANES + Flu    2. Acute non-recurrent maxillary sinusitis  Patient vapes, has allergies and purulent nasal drainage.  We will treat for secondary infections and follow.       Follow Up:   No follow-ups on file.      At [facility], we believe that sharing information builds trust and better relationships. You are receiving this note because you recently visited [facility]. It is possible you will see health information before a provider has talked with you about it. This kind of information can be easy to misunderstand. To help you fully understand what it means for your health, we urge you to discuss this note with your provider.    Rachel Ma PA-C  [facility] [place] [facility]

## 2024-09-23 ENCOUNTER — LAB (OUTPATIENT)
Dept: FAMILY MEDICINE CLINIC | Facility: CLINIC | Age: 34
End: 2024-09-23
Payer: COMMERCIAL

## 2024-09-23 ENCOUNTER — OFFICE VISIT (OUTPATIENT)
Dept: FAMILY MEDICINE CLINIC | Facility: CLINIC | Age: 34
End: 2024-09-23
Payer: COMMERCIAL

## 2024-09-23 VITALS
HEART RATE: 88 BPM | DIASTOLIC BLOOD PRESSURE: 80 MMHG | TEMPERATURE: 99.3 F | SYSTOLIC BLOOD PRESSURE: 124 MMHG | RESPIRATION RATE: 18 BRPM | OXYGEN SATURATION: 99 % | WEIGHT: 143.8 LBS | HEIGHT: 62 IN | BODY MASS INDEX: 26.46 KG/M2

## 2024-09-23 DIAGNOSIS — R35.0 URINE FREQUENCY: ICD-10-CM

## 2024-09-23 DIAGNOSIS — R35.0 URINE FREQUENCY: Primary | ICD-10-CM

## 2024-09-23 LAB
BILIRUB BLD-MCNC: NEGATIVE MG/DL
CLARITY, POC: CLEAR
COLOR UR: YELLOW
EXPIRATION DATE: NORMAL
GLUCOSE BLDC GLUCOMTR-MCNC: 106 MG/DL (ref 70–130)
GLUCOSE UR STRIP-MCNC: NEGATIVE MG/DL
KETONES UR QL: NEGATIVE
LEUKOCYTE EST, POC: NEGATIVE
Lab: NORMAL
NITRITE UR-MCNC: NEGATIVE MG/ML
PH UR: 6 [PH] (ref 5–8)
PROT UR STRIP-MCNC: NEGATIVE MG/DL
RBC # UR STRIP: NEGATIVE /UL
SP GR UR: 1.02 (ref 1–1.03)
UROBILINOGEN UR QL: NORMAL

## 2024-09-23 PROCEDURE — 87491 CHLMYD TRACH DNA AMP PROBE: CPT | Performed by: PHYSICIAN ASSISTANT

## 2024-09-23 PROCEDURE — 87086 URINE CULTURE/COLONY COUNT: CPT | Performed by: PHYSICIAN ASSISTANT

## 2024-09-23 PROCEDURE — 82948 REAGENT STRIP/BLOOD GLUCOSE: CPT | Performed by: PHYSICIAN ASSISTANT

## 2024-09-23 PROCEDURE — 87591 N.GONORRHOEAE DNA AMP PROB: CPT | Performed by: PHYSICIAN ASSISTANT

## 2024-09-23 PROCEDURE — 81003 URINALYSIS AUTO W/O SCOPE: CPT | Performed by: PHYSICIAN ASSISTANT

## 2024-09-23 PROCEDURE — 99213 OFFICE O/P EST LOW 20 MIN: CPT | Performed by: PHYSICIAN ASSISTANT

## 2024-09-23 RX ORDER — OXYBUTYNIN CHLORIDE 10 MG/1
10 TABLET, EXTENDED RELEASE ORAL DAILY
Qty: 30 TABLET | Refills: 1 | Status: SHIPPED | OUTPATIENT
Start: 2024-09-23

## 2024-09-23 NOTE — PROGRESS NOTES
Answers submitted by the patient for this visit:  Other (Submitted on 2024)  Please describe your symptoms.: Frequent urination and pressure  Have you had these symptoms before?: No  How long have you been having these symptoms?: Greater than 2 weeks  Primary Reason for Visit (Submitted on 2024)  What is the primary reason for your visit?: Problem Not Listed      Follow Up Office Visit      Date: 2024   Patient Name: Gina Ferreira  : 1990   MRN: [id number]     Chief Complaint:    Chief Complaint   Patient presents with    Urinary Frequency    Abdominal Pain     Pressure in low abd x 2-3 months.       History of Present Illness: Gina Ferreira is a 33 y.o. female who is here today for complaints of urinary frequency and pelvic pressure and pain.  Has been bothering her off and off for 3 weeks.  She states that she feels like she urinates a lot into this as a sensation of incomplete emptying she sometimes has urgency as well but no true dysuria.  The feeling is more of a diffuse pelvic pressure sensation.  Her cycles have been normal.  She has been under little bit more stress than usual because of family demands..  He is concerned that she might have issues with her blood sugar because of family history of diabetes.  She does have a time few years ago when there was special to sexually transmitted diseases but she has never been diagnosed with 1.    Subjective      Review of Systems:   Review of Systems   Constitutional: Negative.    HENT: Negative.     Respiratory: Negative.     Cardiovascular: Negative.    Gastrointestinal:  Positive for abdominal distention (Pelvic).   Genitourinary:  Positive for frequency, pelvic pressure and urgency. Negative for dyspareunia, dysuria, flank pain, hematuria, menstrual problem and urinary incontinence.     I have reviewed the patients family history, social history, past medical history, past surgical history and have updated it as appropriate.  "    Medications:     Current Outpatient Medications:     polyethylene glycol (MiraLax) 17 GM/SCOOP powder, Take 17 g by mouth Daily., Disp: 116 g, Rfl: 1    SUMAtriptan (Imitrex) 100 MG tablet, Take one tablet at onset of headache. May repeat dose one time in 2 hours if headache not relieved., Disp: 9 tablet, Rfl: 11    Allergies:   No Known Allergies    Objective     Physical Exam: Please see above  Vital Signs:   Vitals:    09/23/24 1324   BP: 124/80   BP Location: Left arm   Patient Position: Sitting   Cuff Size: Adult   Pulse: 88   Resp: 18   Temp: 99.3 °F (37.4 °C)   TempSrc: Temporal   SpO2: 99%   Weight: 65.2 kg (143 lb 12.8 oz)   Height: 157.5 cm (62\")     Body mass index is 26.3 kg/m².    Physical Exam  Vitals and nursing note reviewed.   Constitutional:       General: She is not in acute distress.     Appearance: Normal appearance. She is not ill-appearing or toxic-appearing.   Eyes:      Extraocular Movements: Extraocular movements intact.      Pupils: Pupils are equal, round, and reactive to light.   Abdominal:      General: Abdomen is flat. There is distension (PVR was performed and revealed less than 30 cc in her bladder).      Palpations: Abdomen is soft.      Tenderness: There is no right CVA tenderness or left CVA tenderness.   Neurological:      Mental Status: She is alert.     Procedures    Assessment / Plan      Assessment/Plan:   1. Urine frequency  Normal urine, normal point-of-care glucose and normal PVR.  We will treat for overactive bladder disease and follow.  If she continues to have symptomatology we may have to ultrasound of her pelvis  - POCT urinalysis dipstick, automated       Follow Up:   No follow-ups on file.      At [facility], we believe that sharing information builds trust and better relationships. You are receiving this note because you recently visited [facility]. It is possible you will see health information before a provider has talked with you about it. This kind " of information can be easy to misunderstand. To help you fully understand what it means for your health, we urge you to discuss this note with your provider.    Rachel Ma PA-C  [facility]

## 2024-09-25 ENCOUNTER — TELEPHONE (OUTPATIENT)
Dept: FAMILY MEDICINE CLINIC | Facility: CLINIC | Age: 34
End: 2024-09-25
Payer: COMMERCIAL

## 2024-09-25 LAB
BACTERIA SPEC AEROBE CULT: NO GROWTH
C TRACH RRNA SPEC QL NAA+PROBE: NEGATIVE
N GONORRHOEA RRNA SPEC QL NAA+PROBE: NEGATIVE

## 2025-01-14 DIAGNOSIS — G43.709 CHRONIC MIGRAINE WITHOUT AURA WITHOUT STATUS MIGRAINOSUS, NOT INTRACTABLE: ICD-10-CM

## 2025-01-14 RX ORDER — SUMATRIPTAN SUCCINATE 100 MG/1
TABLET ORAL
Qty: 9 TABLET | Refills: 11 | Status: SHIPPED | OUTPATIENT
Start: 2025-01-14

## 2025-03-11 ENCOUNTER — TELEPHONE (OUTPATIENT)
Dept: FAMILY MEDICINE CLINIC | Facility: CLINIC | Age: 35
End: 2025-03-11

## 2025-03-11 ENCOUNTER — OFFICE VISIT (OUTPATIENT)
Dept: FAMILY MEDICINE CLINIC | Facility: CLINIC | Age: 35
End: 2025-03-11
Payer: COMMERCIAL

## 2025-03-11 VITALS
HEART RATE: 74 BPM | OXYGEN SATURATION: 100 % | SYSTOLIC BLOOD PRESSURE: 124 MMHG | WEIGHT: 136.4 LBS | TEMPERATURE: 98.7 F | RESPIRATION RATE: 15 BRPM | BODY MASS INDEX: 25.1 KG/M2 | HEIGHT: 62 IN | DIASTOLIC BLOOD PRESSURE: 82 MMHG

## 2025-03-11 DIAGNOSIS — R35.0 URINARY FREQUENCY: ICD-10-CM

## 2025-03-11 DIAGNOSIS — R30.0 DYSURIA: ICD-10-CM

## 2025-03-11 DIAGNOSIS — R10.2 PELVIC PRESSURE IN FEMALE: Primary | ICD-10-CM

## 2025-03-11 PROCEDURE — 99213 OFFICE O/P EST LOW 20 MIN: CPT | Performed by: PHYSICIAN ASSISTANT

## 2025-03-11 RX ORDER — TAMSULOSIN HYDROCHLORIDE 0.4 MG/1
1 CAPSULE ORAL DAILY
Qty: 30 CAPSULE | Refills: 1 | Status: SHIPPED | OUTPATIENT
Start: 2025-03-11

## 2025-03-11 NOTE — PROGRESS NOTES
Answers submitted by the patient for this visit:  Problem not listed (Submitted on 3/5/2025)  Chief Complaint: Other medical problem  Reason for appointment: Frequent urination  abdominal pain: No  anorexia: No  joint pain: No  change in stool: No  chest pain: No  chills: No  nasal congestion: No  cough: No  diaphoresis: No  fatigue: No  fever: No  headaches: No  joint swelling: No  myalgias: No  nausea: No  neck pain: No  numbness: No  rash: No  sore throat: No  swollen glands: No  dysuria: No  vertigo: No  visual change: No  vomiting: No  weakness: No  Other symptom: Frequent urination  Onset: 6 to 12 months  Chronicity: recurrent  Frequency: constantly  Answers submitted by the patient for this visit:  Problem not listed (Submitted on 3/5/2025)  Chief Complaint: Other medical problem  Reason for appointment: Frequent urination  abdominal pain: No  anorexia: No  joint pain: No  change in stool: No  chest pain: No  chills: No  nasal congestion: No  cough: No  diaphoresis: No  fatigue: No  fever: No  headaches: No  joint swelling: No  myalgias: No  nausea: No  neck pain: No  numbness: No  rash: No  sore throat: No  swollen glands: No  dysuria: No  vertigo: No  visual change: No  vomiting: No  weakness: No  Other symptom: Frequent urination  Onset: 6 to 12 months  Chronicity: recurrent  Frequency: constantly      Follow Up Office Visit      Date: 2025   Patient Name: Gina Ferreira  : 1990   MRN: [id number]     Chief Complaint:    Chief Complaint   Patient presents with    Follow-up     bladder       History of Present Illness: Gina Ferreira is a 34 y.o. female who is here today for .  History of Present Illness  The patient presents for evaluation of urinary frequency.    She reports persistent urinary frequency despite previous treatment with ditropan. She consumes approximately 1.5 bottles of water daily but continues to experience frequent urination. She describes a sensation of pressure in  the suprapubic region when lying down at night, accompanied by an unusual feeling around her umbilicus. Her nightly routine includes waiting at least 2 hours post-urination before showering due to the anticipation of further urinary urgency. She reports no abnormal vaginal discharge, although she notes a slight increase in discharge as she approaches her menstrual period. She also experiences post-void dribbling, necessitating additional time on the toilet to ensure complete bladder emptying. This symptom is particularly bothersome during her menstrual period and following sexual intercourse. She does not experience any pain during urination but expresses frustration with the constant urge to urinate. She recalls that these symptoms began last summer, following frequent visits to the [place].    FAMILY HISTORY  The patient has a family history of diabetes.       Subjective      Review of Systems:   Review of Systems   Constitutional:  Negative for chills, diaphoresis, fatigue and fever.   HENT:  Negative for congestion, sore throat and swollen glands.    Respiratory:  Negative for cough.    Cardiovascular:  Negative for chest pain.   Gastrointestinal:  Negative for abdominal pain, nausea and vomiting.   Genitourinary:  Negative for dysuria.   Musculoskeletal:  Negative for myalgias and neck pain.   Skin:  Negative for rash.   Neurological:  Negative for weakness and numbness.     I have reviewed the patients family history, social history, past medical history, past surgical history and have updated it as appropriate.     Medications:     Current Outpatient Medications:     polyethylene glycol (MiraLax) 17 GM/SCOOP powder, Take 17 g by mouth Daily., Disp: 116 g, Rfl: 1    SUMAtriptan (Imitrex) 100 MG tablet, Take one tablet at onset of headache. May repeat dose one time in 2 hours if headache not relieved., Disp: 9 tablet, Rfl: 11    tamsulosin (FLOMAX) 0.4 MG capsule 24 hr capsule, Take 1 capsule by mouth  "Daily., Disp: 30 capsule, Rfl: 1    Allergies:   No Known Allergies    Objective     Physical Exam: Please see above  Vital Signs:   Vitals:    03/11/25 1600   BP: 124/82   BP Location: Right arm   Patient Position: Sitting   Cuff Size: Adult   Pulse: 74   Resp: 15   Temp: 98.7 °F (37.1 °C)   TempSrc: Temporal   SpO2: 100%   Weight: 61.9 kg (136 lb 6.4 oz)   Height: 157.5 cm (62\")     Body mass index is 24.95 kg/m².    Physical Exam  Vitals and nursing note reviewed.   Constitutional:       General: She is not in acute distress.     Appearance: Normal appearance. She is not ill-appearing or toxic-appearing.   Abdominal:      General: Abdomen is flat.      Palpations: Abdomen is soft.      Tenderness: There is no right CVA tenderness or left CVA tenderness.      Comments: Post void residual of 30cc   Neurological:      Mental Status: She is alert.     Procedures    Assessment / Plan      Assessment/Plan:   1. Pelvic pressure in female  We will out GYN cause for pelvic pressure with ultasonography  - US Pelvis Complete; Future    2. Urinary frequency  Her symptoms have not improved with the previous medication. She reports frequent urination, especially at night, and a sensation of incomplete bladder emptying. There is no associated pain during urination. Her cultures from September 2024 were negative. A post-void residual urine test will be conducted today to assess bladder emptying. If significant urine remains, medication to relax the bladder will be considered. Additionally, tests for chlamydia and gonorrhea will be performed to rule out infections. Kidney function and blood sugar levels will also be checked due to her family history.  - tamsulosin (FLOMAX) 0.4 MG capsule 24 hr capsule; Take 1 capsule by mouth Daily.  Dispense: 30 capsule; Refill: 1       Assessment & Plan  1. Urinary frequency.  Her symptoms have not improved with the previous medication. She reports frequent urination, especially at night, and a " sensation of incomplete bladder emptying. There is no associated pain during urination. Her cultures from September 2024 were negative. A post-void residual urine test will be conducted today to assess bladder emptying. If significant urine remains, medication to relax the bladder will be considered. Additionally, tests for chlamydia and gonorrhea will be performed to rule out infections. Kidney function and blood sugar levels will also be checked due to her family history.    Follow Up:   No follow-ups on file.      At [facility], we believe that sharing information builds trust and better relationships. You are receiving this note because you recently visited [facility]. It is possible you will see health information before a provider has talked with you about it. This kind of information can be easy to misunderstand. To help you fully understand what it means for your health, we urge you to discuss this note with your provider.    Patient or patient representative verbalized consent for the use of Ambient Listening during the visit with  Rachel Ma PA-C for chart documentation. 3/11/2025  16:49 EDT     Rachel Ma PA-C  [facility]

## 2025-03-12 LAB
BILIRUB BLD-MCNC: NEGATIVE MG/DL
CLARITY, POC: CLEAR
COLOR UR: YELLOW
EXPIRATION DATE: NORMAL
GLUCOSE UR STRIP-MCNC: NEGATIVE MG/DL
KETONES UR QL: NEGATIVE
LEUKOCYTE EST, POC: NEGATIVE
Lab: NORMAL
NITRITE UR-MCNC: NEGATIVE MG/ML
PH UR: 6 [PH] (ref 5–8)
PROT UR STRIP-MCNC: NEGATIVE MG/DL
RBC # UR STRIP: NEGATIVE /UL
SP GR UR: 1.02 (ref 1–1.03)
UROBILINOGEN UR QL: NORMAL

## 2025-03-14 ENCOUNTER — OFFICE VISIT (OUTPATIENT)
Dept: FAMILY MEDICINE CLINIC | Facility: CLINIC | Age: 35
End: 2025-03-14
Payer: COMMERCIAL

## 2025-03-14 VITALS
HEART RATE: 97 BPM | DIASTOLIC BLOOD PRESSURE: 70 MMHG | SYSTOLIC BLOOD PRESSURE: 120 MMHG | TEMPERATURE: 98 F | WEIGHT: 136 LBS | RESPIRATION RATE: 18 BRPM | HEIGHT: 62 IN | BODY MASS INDEX: 25.03 KG/M2 | OXYGEN SATURATION: 99 %

## 2025-03-14 DIAGNOSIS — J06.9 UPPER RESPIRATORY TRACT INFECTION, UNSPECIFIED TYPE: Primary | ICD-10-CM

## 2025-03-14 DIAGNOSIS — B37.31 VAGINAL YEAST INFECTION: ICD-10-CM

## 2025-03-14 DIAGNOSIS — J02.9 SORE THROAT: ICD-10-CM

## 2025-03-14 PROCEDURE — 99213 OFFICE O/P EST LOW 20 MIN: CPT | Performed by: NURSE PRACTITIONER

## 2025-03-14 PROCEDURE — 87880 STREP A ASSAY W/OPTIC: CPT | Performed by: NURSE PRACTITIONER

## 2025-03-14 PROCEDURE — 87428 SARSCOV & INF VIR A&B AG IA: CPT | Performed by: NURSE PRACTITIONER

## 2025-03-14 RX ORDER — DEXTROMETHORPHAN HYDROBROMIDE AND PROMETHAZINE HYDROCHLORIDE 15; 6.25 MG/5ML; MG/5ML
5 SYRUP ORAL 4 TIMES DAILY PRN
Qty: 118 ML | Refills: 0 | Status: SHIPPED | OUTPATIENT
Start: 2025-03-14

## 2025-03-14 RX ORDER — FLUCONAZOLE 150 MG/1
150 TABLET ORAL ONCE
Qty: 1 TABLET | Refills: 0 | Status: SHIPPED | OUTPATIENT
Start: 2025-03-14 | End: 2025-03-14

## 2025-03-14 NOTE — PROGRESS NOTES
Office Note     Name: Gina Ferreira    : 1990     MRN: [id number]     Chief Complaint  Sore Throat, Shortness of Breath, and Eye Pain    Subjective     History of Present Illness:  Gina Ferreira is a 34 y.o. female who presents today for upper respiratory symptoms.   History of Present Illness  The patient is a 34-year-old female who presents for evaluation of chest discomfort, sore throat, eye discharge, and shortness of breath.    She has been experiencing malaise for several days, initially attributing it to allergies. She reports chest discomfort characterized by a burning sensation and difficulty in breathing, necessitating frequent throat clearing. She suspects a possible strep infection, given her son's recent diagnosis, although no swab tests have been conducted yet. She does not experience fever, chills, or sweats but admits to feeling fatigued and unable to perform her usual activities, including work. Her appetite and hydration remain unaffected. She experiences nasal congestion and sneezing, particularly when lying down, but does not report postnasal drainage or ear pain. She reports a sore throat and facial pressure, with a sensation of heaviness and swelling around her eyes, which have been discharging a yellow fluid. Her eyes are not matted upon waking but have dried yellow residue around the corners and mild tearing. She reports itching in her eyes. She does not report lymphadenopathy but feels irritation in her throat, leading to frequent clearing. She does not produce any sputum when coughing but experiences shortness of breath during conversation. She does not have a history of asthma or COPD and reports no wheezing. She does not experience nausea, vomiting, diarrhea, headache, lightheadedness, or dizziness. She experienced mild body aches yesterday, which she occasionally associates with impending rain. She is open to undergoing tests for COVID-19, influenza, and rapid strep.  She is a non-smoker and does not have a history of bronchitis. She has not contracted strep from her son in the past and estimates it has been approximately 20 years since her last strep infection.    SOCIAL HISTORY  She has a history of smoking and is a current vaper.    Review of Systems:   Review of Systems   Constitutional:  Positive for activity change (decreased activity) and fatigue. Negative for appetite change, chills and fever.   HENT:  Positive for congestion, rhinorrhea, sinus pressure, sneezing and sore throat. Negative for ear pain, postnasal drip and swollen glands.    Eyes:  Positive for discharge. Negative for itching.   Respiratory:  Positive for cough and shortness of breath. Negative for wheezing.    Gastrointestinal:  Negative for diarrhea, nausea and vomiting.   Musculoskeletal:  Positive for myalgias.   Neurological:  Negative for dizziness, light-headedness and headache.       Past Medical History:   Past Medical History:   Diagnosis Date    Abdominal discomfort in right upper quadrant     Allergic rhinitis, seasonal     Anxiety     Anxiety diarrhea     Anxiety disorder, unspecified     Bronchitis, acute     Candida vaginitis     Carpal tunnel syndrome, bilateral     Constipation     Contact dermatitis and eczema due to detergents     Cough     COVID     Diarrhea     Dysuria     Gaseous abdominal distention     Gastritis     Gastroenteritis, acute     GERD (gastroesophageal reflux disease)     Hyperglycemia     Hypertension, benign     Irritable bowel syndrome with constipation     Lack of immunity to hepatitis B virus demonstrated by serologic test     Low back pain     Lymphadenopathy     Muscle contraction headache     Nausea     Rheumatic nodule     Tinea sussy     Tobacco use disorder     Tooth abscess     Uncomplicated herpes simplex     URI (upper respiratory infection)     UTI (urinary tract infection)        Past Surgical History:   Past Surgical History:   Procedure Laterality Date  "   TUBAL ABDOMINAL LIGATION         Immunizations:   There is no immunization history on file for this patient.     Medications:     Current Outpatient Medications:     polyethylene glycol (MiraLax) 17 GM/SCOOP powder, Take 17 g by mouth Daily., Disp: 116 g, Rfl: 1    SUMAtriptan (Imitrex) 100 MG tablet, Take one tablet at onset of headache. May repeat dose one time in 2 hours if headache not relieved., Disp: 9 tablet, Rfl: 11    tamsulosin (FLOMAX) 0.4 MG capsule 24 hr capsule, Take 1 capsule by mouth Daily., Disp: 30 capsule, Rfl: 1    amoxicillin-clavulanate (AUGMENTIN) 875-125 MG per tablet, Take 1 tablet by mouth 2 (Two) Times a Day., Disp: 14 tablet, Rfl: 0    fluconazole (Diflucan) 150 MG tablet, Take 1 tablet by mouth 1 (One) Time for 1 dose., Disp: 1 tablet, Rfl: 0    promethazine-dextromethorphan (PROMETHAZINE-DM) 6.25-15 MG/5ML syrup, Take 5 mL by mouth 4 (Four) Times a Day As Needed for Cough., Disp: 118 mL, Rfl: 0    Allergies:   No Known Allergies    Family History:   Family History   Problem Relation Age of Onset    Stroke Other     Diabetes Other     Hypertension Other     Alcohol abuse Father     Hypertension Father     Arthritis Maternal Grandmother     Cancer Maternal Grandmother     Diabetes Mother        Social History:   Social History     Socioeconomic History    Marital status:    Tobacco Use    Smoking status: Every Day     Types: Electronic Cigarette     Passive exposure: Current    Smokeless tobacco: Current   Vaping Use    Vaping status: Every Day    Substances: Nicotine, Flavoring    Devices: Disposable    Passive vaping exposure: Yes   Substance and Sexual Activity    Alcohol use: Not Currently    Drug use: Not Currently    Sexual activity: Not Currently     Partners: Male         Objective     Vital Signs  /70 (BP Location: Right arm, Patient Position: Sitting, Cuff Size: Adult)   Pulse 97   Temp 98 °F (36.7 °C) (Temporal)   Resp 18   Ht 157.5 cm (62.01\")   Wt 61.7 kg " "(136 lb)   SpO2 99%   BMI 24.87 kg/m²   Estimated body mass index is 24.87 kg/m² as calculated from the following:    Height as of this encounter: 157.5 cm (62.01\").    Weight as of this encounter: 61.7 kg (136 lb).    BMI is within normal parameters. No other follow-up for BMI required.      Physical Exam  Vitals and nursing note reviewed.   Constitutional:       General: She is not in acute distress.     Appearance: Normal appearance. She is not ill-appearing or toxic-appearing.   HENT:      Head: Normocephalic and atraumatic.      Right Ear: Tympanic membrane, ear canal and external ear normal.      Left Ear: Tympanic membrane, ear canal and external ear normal.      Nose: Nose normal.      Comments: Frontal and maxillary tenderness     Mouth/Throat:      Mouth: Mucous membranes are moist.      Pharynx: Posterior oropharyngeal erythema present. No oropharyngeal exudate.   Eyes:      General: No scleral icterus.        Right eye: No discharge.         Left eye: No discharge (promethazine dm).      Conjunctiva/sclera: Conjunctivae normal.      Comments: Periorbital edema   Cardiovascular:      Rate and Rhythm: Normal rate and regular rhythm.      Heart sounds: Normal heart sounds.   Pulmonary:      Effort: Pulmonary effort is normal.      Breath sounds: Normal breath sounds.   Musculoskeletal:         General: Normal range of motion.      Cervical back: Normal range of motion and neck supple. No rigidity or tenderness.   Lymphadenopathy:      Cervical: No cervical adenopathy.   Skin:     General: Skin is warm.   Neurological:      General: No focal deficit present.      Mental Status: She is alert.   Psychiatric:         Mood and Affect: Mood normal.         Behavior: Behavior normal.         Thought Content: Thought content normal.         Judgment: Judgment normal.          Assessment and Plan     Procedures      Lab Results (last 72 hours)       Procedure Component Value Units Date/Time    POC Urinalysis " Dipstick, Automated [[id number]]  (Normal) Collected: 03/12/25 0809    Specimen: Urine Updated: 03/12/25 0825     Color Yellow     Clarity, UA Clear     Specific Gravity  1.025     pH, Urine 6.0     Leukocytes Negative     Nitrite, UA Negative     Protein, POC Negative mg/dL      Glucose, UA Negative mg/dL      Ketones, UA Negative     Urobilinogen, UA Normal     Bilirubin Negative     Blood, UA Negative     Lot Number [id number]     Expiration Date 06/29/2025    Covid-19 + Flu A&B AG, Veritor [[id number]] Collected: 03/14/25 0952    Specimen: Swab Updated: 03/14/25 0952     SARS Antigen Not Detected     Influenza A Antigen JANES Not Detected     Influenza B Antigen JANES Not Detected     Internal Control Passed     Lot Number [id number]     Expiration Date 10/23/2025    POCT rapid strep A [[id number]] Collected: 03/14/25 0953    Specimen: Swab Updated: 03/14/25 0953     Rapid Strep A Screen Negative     Internal Control Passed     Lot Number [id number]     Expiration Date 01/03/2027               Diagnoses and all orders for this visit:    1. Upper respiratory tract infection, unspecified type (Primary)  -     Covid-19 + Flu A&B AG, Veritor  -     amoxicillin-clavulanate (AUGMENTIN) 875-125 MG per tablet; Take 1 tablet by mouth 2 (Two) Times a Day.  Dispense: 14 tablet; Refill: 0  -     promethazine-dextromethorphan (PROMETHAZINE-DM) 6.25-15 MG/5ML syrup; Take 5 mL by mouth 4 (Four) Times a Day As Needed for Cough.  Dispense: 118 mL; Refill: 0    2. Sore throat  -     POCT rapid strep A    3. Vaginal yeast infection  -     fluconazole (Diflucan) 150 MG tablet; Take 1 tablet by mouth 1 (One) Time for 1 dose.  Dispense: 1 tablet; Refill: 0        Assessment & Plan  1. Chest discomfort.  She reports chest discomfort with a burning sensation and difficulty in breathing, necessitating frequent throat clearing. She is negative for COVID-19, influenza, and strep. She has a history of smoking and is a current vaper. She is  having some periorbital swelling and pressure in the frontal and maxillary sinuses with some tenderness. We will proceed to treat with Augmentin 875 mg, 1 tablet twice a day for 7 days, and promethazine DM 1 teaspoon 4 times a day as needed for cough and congestion. The patient requested Diflucan for potential vaginal yeast infection, which occurs when she takes an antibiotic. She is instructed to return as needed.      Follow Up  Return if symptoms worsen or fail to improve.    Patient or patient representative verbalized consent for the use of Ambient Listening during the visit with  ADITYA Lipscomb for chart documentation. 3/14/2025  13:06 EDT    ADITYA Lipscomb [facility]  [address] [place] [address] [address] [place] [address] [phone]

## 2025-04-09 DIAGNOSIS — R35.0 URINARY FREQUENCY: ICD-10-CM

## 2025-04-09 RX ORDER — TAMSULOSIN HYDROCHLORIDE 0.4 MG/1
1 CAPSULE ORAL DAILY
Qty: 30 CAPSULE | Refills: 1 | Status: SHIPPED | OUTPATIENT
Start: 2025-04-09

## 2025-04-21 DIAGNOSIS — N30.00 ACUTE CYSTITIS WITHOUT HEMATURIA: Primary | ICD-10-CM

## 2025-04-21 DIAGNOSIS — K59.00 CONSTIPATION, UNSPECIFIED CONSTIPATION TYPE: ICD-10-CM

## 2025-04-21 RX ORDER — SULFAMETHOXAZOLE AND TRIMETHOPRIM 800; 160 MG/1; MG/1
1 TABLET ORAL 2 TIMES DAILY
Qty: 10 TABLET | Refills: 0 | Status: SHIPPED | OUTPATIENT
Start: 2025-04-21

## 2025-04-21 RX ORDER — POLYETHYLENE GLYCOL 3350 17 G/17G
17 POWDER, FOR SOLUTION ORAL DAILY
Qty: 116 G | Refills: 1 | Status: SHIPPED | OUTPATIENT
Start: 2025-04-21

## 2025-08-05 DIAGNOSIS — R35.0 URINARY FREQUENCY: ICD-10-CM

## 2025-08-05 RX ORDER — TAMSULOSIN HYDROCHLORIDE 0.4 MG/1
1 CAPSULE ORAL DAILY
Qty: 30 CAPSULE | Refills: 1 | Status: SHIPPED | OUTPATIENT
Start: 2025-08-05